# Patient Record
Sex: MALE | Race: WHITE | Employment: OTHER | URBAN - METROPOLITAN AREA
[De-identification: names, ages, dates, MRNs, and addresses within clinical notes are randomized per-mention and may not be internally consistent; named-entity substitution may affect disease eponyms.]

---

## 2017-01-01 ENCOUNTER — APPOINTMENT (OUTPATIENT)
Dept: GENERAL RADIOLOGY | Facility: HOSPITAL | Age: 82
End: 2017-01-01
Attending: EMERGENCY MEDICINE
Payer: MEDICARE

## 2017-01-01 ENCOUNTER — HOSPITAL ENCOUNTER (INPATIENT)
Facility: HOSPITAL | Age: 82
LOS: 2 days | Discharge: NURSING, REHAB OR LONG-TERM CARE | DRG: 300 | End: 2017-01-01
Attending: EMERGENCY MEDICINE | Admitting: HOSPITALIST
Payer: MEDICARE

## 2017-01-01 ENCOUNTER — APPOINTMENT (OUTPATIENT)
Dept: ULTRASOUND IMAGING | Facility: HOSPITAL | Age: 82
DRG: 300 | End: 2017-01-01
Attending: EMERGENCY MEDICINE
Payer: MEDICARE

## 2017-01-01 ENCOUNTER — APPOINTMENT (OUTPATIENT)
Dept: GENERAL RADIOLOGY | Facility: HOSPITAL | Age: 82
End: 2017-01-01
Attending: Other
Payer: MEDICARE

## 2017-01-01 ENCOUNTER — APPOINTMENT (OUTPATIENT)
Dept: CT IMAGING | Facility: HOSPITAL | Age: 82
End: 2017-01-01
Attending: HOSPITALIST
Payer: MEDICARE

## 2017-01-01 ENCOUNTER — HOSPITAL ENCOUNTER (OUTPATIENT)
Facility: HOSPITAL | Age: 82
Setting detail: OBSERVATION
LOS: 1 days | Discharge: NURSING, REHAB OR LONG-TERM CARE | End: 2017-01-01
Attending: EMERGENCY MEDICINE | Admitting: INTERNAL MEDICINE
Payer: MEDICARE

## 2017-01-01 ENCOUNTER — APPOINTMENT (OUTPATIENT)
Dept: GENERAL RADIOLOGY | Facility: HOSPITAL | Age: 82
End: 2017-01-01
Attending: INTERNAL MEDICINE
Payer: MEDICARE

## 2017-01-01 ENCOUNTER — APPOINTMENT (OUTPATIENT)
Dept: CT IMAGING | Facility: HOSPITAL | Age: 82
End: 2017-01-01
Attending: EMERGENCY MEDICINE
Payer: MEDICARE

## 2017-01-01 ENCOUNTER — HOSPITAL ENCOUNTER (EMERGENCY)
Facility: HOSPITAL | Age: 82
Discharge: HOME | End: 2017-01-01
Attending: EMERGENCY MEDICINE
Payer: MEDICARE

## 2017-01-01 ENCOUNTER — APPOINTMENT (OUTPATIENT)
Dept: ULTRASOUND IMAGING | Facility: HOSPITAL | Age: 82
End: 2017-01-01
Attending: EMERGENCY MEDICINE
Payer: MEDICARE

## 2017-01-01 ENCOUNTER — HOSPITAL ENCOUNTER (EMERGENCY)
Facility: HOSPITAL | Age: 82
End: 2017-01-01
Attending: EMERGENCY MEDICINE
Payer: MEDICARE

## 2017-01-01 VITALS
WEIGHT: 178 LBS | OXYGEN SATURATION: 96 % | RESPIRATION RATE: 20 BRPM | SYSTOLIC BLOOD PRESSURE: 91 MMHG | HEART RATE: 60 BPM | DIASTOLIC BLOOD PRESSURE: 47 MMHG | BODY MASS INDEX: 26 KG/M2 | TEMPERATURE: 98 F

## 2017-01-01 VITALS
WEIGHT: 177.94 LBS | DIASTOLIC BLOOD PRESSURE: 61 MMHG | BODY MASS INDEX: 26 KG/M2 | SYSTOLIC BLOOD PRESSURE: 99 MMHG | TEMPERATURE: 99 F | RESPIRATION RATE: 15 BRPM | HEART RATE: 59 BPM | OXYGEN SATURATION: 95 %

## 2017-01-01 VITALS
OXYGEN SATURATION: 99 % | RESPIRATION RATE: 18 BRPM | DIASTOLIC BLOOD PRESSURE: 78 MMHG | TEMPERATURE: 99 F | HEART RATE: 75 BPM | SYSTOLIC BLOOD PRESSURE: 154 MMHG

## 2017-01-01 DIAGNOSIS — S50.00XA CONTUSION OF ELBOW, UNSPECIFIED LATERALITY, INITIAL ENCOUNTER: ICD-10-CM

## 2017-01-01 DIAGNOSIS — S00.83XA CONTUSION OF FACE, INITIAL ENCOUNTER: Primary | ICD-10-CM

## 2017-01-01 DIAGNOSIS — D72.829 LEUKOCYTOSIS, UNSPECIFIED TYPE: ICD-10-CM

## 2017-01-01 DIAGNOSIS — S40.012A TRAUMATIC HEMATOMA OF LEFT SHOULDER, INITIAL ENCOUNTER: ICD-10-CM

## 2017-01-01 DIAGNOSIS — I82.621 ACUTE DEEP VEIN THROMBOSIS (DVT) OF BRACHIAL VEIN OF RIGHT UPPER EXTREMITY (HCC): Primary | ICD-10-CM

## 2017-01-01 DIAGNOSIS — S51.812A SKIN TEAR OF FOREARM WITHOUT COMPLICATION, LEFT, INITIAL ENCOUNTER: ICD-10-CM

## 2017-01-01 DIAGNOSIS — I46.9 CARDIOPULMONARY ARREST (HCC): Primary | ICD-10-CM

## 2017-01-01 DIAGNOSIS — D64.9 ANEMIA, UNSPECIFIED TYPE: ICD-10-CM

## 2017-01-01 DIAGNOSIS — R29.6 FREQUENT FALLS: Primary | ICD-10-CM

## 2017-01-01 DIAGNOSIS — S01.81XA LACERATION OF FACE, INITIAL ENCOUNTER: ICD-10-CM

## 2017-01-01 DIAGNOSIS — Z95.0 PACEMAKER: ICD-10-CM

## 2017-01-01 DIAGNOSIS — S51.019A SKIN TEAR OF ELBOW WITHOUT COMPLICATION, UNSPECIFIED LATERALITY, INITIAL ENCOUNTER: ICD-10-CM

## 2017-01-01 DIAGNOSIS — N28.9 RENAL INSUFFICIENCY: ICD-10-CM

## 2017-01-01 DIAGNOSIS — I42.9 CARDIOMYOPATHY, UNSPECIFIED TYPE (HCC): ICD-10-CM

## 2017-01-01 LAB
APTT PPP: 101.5 SECONDS (ref 25–34)
APTT PPP: 29.7 SECONDS (ref 25–34)
APTT PPP: 90.6 SECONDS (ref 25–34)
APTT PPP: 96.7 SECONDS (ref 25–34)
BASOPHILS # BLD AUTO: 0.04 X10(3) UL (ref 0–0.1)
BASOPHILS NFR BLD AUTO: 0.3 %
BUN BLD-MCNC: 24 MG/DL (ref 8–20)
BUN BLD-MCNC: 27 MG/DL (ref 8–20)
CALCIUM BLD-MCNC: 8.6 MG/DL (ref 8.3–10.3)
CALCIUM BLD-MCNC: 8.7 MG/DL (ref 8.3–10.3)
CHLORIDE: 111 MMOL/L (ref 101–111)
CHLORIDE: 111 MMOL/L (ref 101–111)
CO2: 25 MMOL/L (ref 22–32)
CO2: 27 MMOL/L (ref 22–32)
CREAT BLD-MCNC: 1.42 MG/DL (ref 0.7–1.3)
CREAT BLD-MCNC: 1.58 MG/DL (ref 0.7–1.3)
EOSINOPHIL # BLD AUTO: 0.13 X10(3) UL (ref 0–0.3)
EOSINOPHIL NFR BLD AUTO: 1 %
ERYTHROCYTE [DISTWIDTH] IN BLOOD BY AUTOMATED COUNT: 13.7 % (ref 11.5–16)
GLUCOSE BLD-MCNC: 105 MG/DL (ref 70–99)
GLUCOSE BLD-MCNC: 139 MG/DL (ref 70–99)
GLUCOSE BLD-MCNC: 203 MG/DL (ref 65–99)
HCT VFR BLD AUTO: 33.8 % (ref 37–53)
HGB BLD-MCNC: 11.1 G/DL (ref 13–17)
IMMATURE GRANULOCYTE COUNT: 0.33 X10(3) UL (ref 0–1)
IMMATURE GRANULOCYTE RATIO %: 2.6 %
INR BLD: 1.19 (ref 0.89–1.11)
LYMPHOCYTES # BLD AUTO: 0.87 X10(3) UL (ref 0.9–4)
LYMPHOCYTES NFR BLD AUTO: 6.9 %
MCH RBC QN AUTO: 33.4 PG (ref 27–33.2)
MCHC RBC AUTO-ENTMCNC: 32.8 G/DL (ref 31–37)
MCV RBC AUTO: 101.8 FL (ref 80–99)
MONOCYTES # BLD AUTO: 1.07 X10(3) UL (ref 0.1–0.6)
MONOCYTES NFR BLD AUTO: 8.5 %
NEUTROPHIL ABS PRELIM: 10.14 X10 (3) UL (ref 1.3–6.7)
NEUTROPHILS # BLD AUTO: 10.14 X10(3) UL (ref 1.3–6.7)
NEUTROPHILS NFR BLD AUTO: 80.7 %
PLATELET # BLD AUTO: 319 10(3)UL (ref 150–450)
PLATELET # BLD AUTO: 348 10(3)UL (ref 150–450)
POTASSIUM SERPL-SCNC: 4.2 MMOL/L (ref 3.6–5.1)
POTASSIUM SERPL-SCNC: 4.6 MMOL/L (ref 3.6–5.1)
PSA SERPL DL<=0.01 NG/ML-MCNC: 15.2 SECONDS (ref 12–14.3)
RBC # BLD AUTO: 3.32 X10(6)UL (ref 3.8–5.8)
RED CELL DISTRIBUTION WIDTH-SD: 50.9 FL (ref 35.1–46.3)
SODIUM SERPL-SCNC: 142 MMOL/L (ref 136–144)
SODIUM SERPL-SCNC: 144 MMOL/L (ref 136–144)
WBC # BLD AUTO: 12.6 X10(3) UL (ref 4–13)

## 2017-01-01 PROCEDURE — 12011 RPR F/E/E/N/L/M 2.5 CM/<: CPT

## 2017-01-01 PROCEDURE — 99285 EMERGENCY DEPT VISIT HI MDM: CPT

## 2017-01-01 PROCEDURE — 70450 CT HEAD/BRAIN W/O DYE: CPT | Performed by: EMERGENCY MEDICINE

## 2017-01-01 PROCEDURE — 97530 THERAPEUTIC ACTIVITIES: CPT

## 2017-01-01 PROCEDURE — 97110 THERAPEUTIC EXERCISES: CPT

## 2017-01-01 PROCEDURE — 84484 ASSAY OF TROPONIN QUANT: CPT | Performed by: EMERGENCY MEDICINE

## 2017-01-01 PROCEDURE — 81003 URINALYSIS AUTO W/O SCOPE: CPT | Performed by: FAMILY MEDICINE

## 2017-01-01 PROCEDURE — 85730 THROMBOPLASTIN TIME PARTIAL: CPT | Performed by: HOSPITALIST

## 2017-01-01 PROCEDURE — 76377 3D RENDER W/INTRP POSTPROCES: CPT | Performed by: HOSPITALIST

## 2017-01-01 PROCEDURE — 73560 X-RAY EXAM OF KNEE 1 OR 2: CPT | Performed by: INTERNAL MEDICINE

## 2017-01-01 PROCEDURE — 83090 ASSAY OF HOMOCYSTEINE: CPT | Performed by: OTHER

## 2017-01-01 PROCEDURE — 97535 SELF CARE MNGMENT TRAINING: CPT

## 2017-01-01 PROCEDURE — 84484 ASSAY OF TROPONIN QUANT: CPT | Performed by: INTERNAL MEDICINE

## 2017-01-01 PROCEDURE — 92950 HEART/LUNG RESUSCITATION CPR: CPT

## 2017-01-01 PROCEDURE — 83921 ORGANIC ACID SINGLE QUANT: CPT | Performed by: OTHER

## 2017-01-01 PROCEDURE — 80048 BASIC METABOLIC PNL TOTAL CA: CPT | Performed by: EMERGENCY MEDICINE

## 2017-01-01 PROCEDURE — 96361 HYDRATE IV INFUSION ADD-ON: CPT

## 2017-01-01 PROCEDURE — 85027 COMPLETE CBC AUTOMATED: CPT | Performed by: INTERNAL MEDICINE

## 2017-01-01 PROCEDURE — 80053 COMPREHEN METABOLIC PANEL: CPT | Performed by: EMERGENCY MEDICINE

## 2017-01-01 PROCEDURE — 85049 AUTOMATED PLATELET COUNT: CPT | Performed by: HOSPITALIST

## 2017-01-01 PROCEDURE — 85025 COMPLETE CBC W/AUTO DIFF WBC: CPT | Performed by: EMERGENCY MEDICINE

## 2017-01-01 PROCEDURE — 97116 GAIT TRAINING THERAPY: CPT

## 2017-01-01 PROCEDURE — 93010 ELECTROCARDIOGRAM REPORT: CPT

## 2017-01-01 PROCEDURE — 72125 CT NECK SPINE W/O DYE: CPT | Performed by: EMERGENCY MEDICINE

## 2017-01-01 PROCEDURE — 73080 X-RAY EXAM OF ELBOW: CPT | Performed by: EMERGENCY MEDICINE

## 2017-01-01 PROCEDURE — 80048 BASIC METABOLIC PNL TOTAL CA: CPT | Performed by: INTERNAL MEDICINE

## 2017-01-01 PROCEDURE — 73030 X-RAY EXAM OF SHOULDER: CPT | Performed by: EMERGENCY MEDICINE

## 2017-01-01 PROCEDURE — 97162 PT EVAL MOD COMPLEX 30 MIN: CPT

## 2017-01-01 PROCEDURE — 97166 OT EVAL MOD COMPLEX 45 MIN: CPT

## 2017-01-01 PROCEDURE — 82746 ASSAY OF FOLIC ACID SERUM: CPT | Performed by: OTHER

## 2017-01-01 PROCEDURE — 99284 EMERGENCY DEPT VISIT MOD MDM: CPT

## 2017-01-01 PROCEDURE — 85730 THROMBOPLASTIN TIME PARTIAL: CPT | Performed by: EMERGENCY MEDICINE

## 2017-01-01 PROCEDURE — 71010 XR CHEST AP PORTABLE  (CPT=71010): CPT | Performed by: EMERGENCY MEDICINE

## 2017-01-01 PROCEDURE — 81001 URINALYSIS AUTO W/SCOPE: CPT | Performed by: EMERGENCY MEDICINE

## 2017-01-01 PROCEDURE — 93971 EXTREMITY STUDY: CPT | Performed by: EMERGENCY MEDICINE

## 2017-01-01 PROCEDURE — 84132 ASSAY OF SERUM POTASSIUM: CPT | Performed by: INTERNAL MEDICINE

## 2017-01-01 PROCEDURE — 85025 COMPLETE CBC W/AUTO DIFF WBC: CPT | Performed by: INTERNAL MEDICINE

## 2017-01-01 PROCEDURE — 73200 CT UPPER EXTREMITY W/O DYE: CPT | Performed by: HOSPITALIST

## 2017-01-01 PROCEDURE — 96365 THER/PROPH/DIAG IV INF INIT: CPT

## 2017-01-01 PROCEDURE — 85610 PROTHROMBIN TIME: CPT | Performed by: EMERGENCY MEDICINE

## 2017-01-01 PROCEDURE — 97167 OT EVAL HIGH COMPLEX 60 MIN: CPT

## 2017-01-01 PROCEDURE — 93005 ELECTROCARDIOGRAM TRACING: CPT

## 2017-01-01 PROCEDURE — 82607 VITAMIN B-12: CPT | Performed by: OTHER

## 2017-01-01 PROCEDURE — 96360 HYDRATION IV INFUSION INIT: CPT

## 2017-01-01 PROCEDURE — 83735 ASSAY OF MAGNESIUM: CPT | Performed by: INTERNAL MEDICINE

## 2017-01-01 PROCEDURE — 82962 GLUCOSE BLOOD TEST: CPT

## 2017-01-01 PROCEDURE — 83036 HEMOGLOBIN GLYCOSYLATED A1C: CPT | Performed by: OTHER

## 2017-01-01 PROCEDURE — 72110 X-RAY EXAM L-2 SPINE 4/>VWS: CPT | Performed by: OTHER

## 2017-01-01 RX ORDER — LISINOPRIL 5 MG/1
5 TABLET ORAL DAILY
Status: DISCONTINUED | OUTPATIENT
Start: 2017-01-01 | End: 2017-01-01

## 2017-01-01 RX ORDER — ASPIRIN 81 MG/1
81 TABLET ORAL DAILY
Status: DISCONTINUED | OUTPATIENT
Start: 2017-01-01 | End: 2017-01-01

## 2017-01-01 RX ORDER — CAPTOPRIL 25 MG/1
25 TABLET ORAL 2 TIMES DAILY
Status: DISCONTINUED | OUTPATIENT
Start: 2017-01-01 | End: 2017-01-01

## 2017-01-01 RX ORDER — FINASTERIDE 5 MG/1
5 TABLET, FILM COATED ORAL NIGHTLY
Status: DISCONTINUED | OUTPATIENT
Start: 2017-01-01 | End: 2017-01-01

## 2017-01-01 RX ORDER — HEPARIN SODIUM AND DEXTROSE 10000; 5 [USP'U]/100ML; G/100ML
18 INJECTION INTRAVENOUS CONTINUOUS
Status: DISCONTINUED | OUTPATIENT
Start: 2017-01-01 | End: 2017-01-01

## 2017-01-01 RX ORDER — ACETAMINOPHEN 325 MG/1
650 TABLET ORAL EVERY 6 HOURS PRN
Status: DISCONTINUED | OUTPATIENT
Start: 2017-01-01 | End: 2017-01-01

## 2017-01-01 RX ORDER — ALLOPURINOL 100 MG/1
250 TABLET ORAL DAILY
COMMUNITY

## 2017-01-01 RX ORDER — HEPARIN SODIUM AND DEXTROSE 10000; 5 [USP'U]/100ML; G/100ML
INJECTION INTRAVENOUS CONTINUOUS
Status: DISCONTINUED | OUTPATIENT
Start: 2017-01-01 | End: 2017-01-01

## 2017-01-01 RX ORDER — FINASTERIDE 5 MG/1
5 TABLET, FILM COATED ORAL DAILY
COMMUNITY

## 2017-01-01 RX ORDER — AMIODARONE HYDROCHLORIDE 200 MG/1
200 TABLET ORAL DAILY
Status: DISCONTINUED | OUTPATIENT
Start: 2017-01-01 | End: 2017-01-01

## 2017-01-01 RX ORDER — LISINOPRIL 5 MG/1
5 TABLET ORAL DAILY
Qty: 30 TABLET | Refills: 3 | Status: SHIPPED | OUTPATIENT
Start: 2017-01-01

## 2017-01-01 RX ORDER — ALFUZOSIN HYDROCHLORIDE 10 MG/1
10 TABLET, EXTENDED RELEASE ORAL
Status: DISCONTINUED | OUTPATIENT
Start: 2017-01-01 | End: 2017-01-01

## 2017-01-01 RX ORDER — CHOLECALCIFEROL (VITAMIN D3) 125 MCG
500 CAPSULE ORAL DAILY
Status: DISCONTINUED | OUTPATIENT
Start: 2017-01-01 | End: 2017-01-01

## 2017-01-01 RX ORDER — ONDANSETRON 2 MG/ML
4 INJECTION INTRAMUSCULAR; INTRAVENOUS EVERY 4 HOURS PRN
Status: CANCELLED | OUTPATIENT
Start: 2017-01-01

## 2017-01-01 RX ORDER — CETIRIZINE HYDROCHLORIDE 10 MG/1
10 TABLET ORAL DAILY
Status: DISCONTINUED | OUTPATIENT
Start: 2017-01-01 | End: 2017-01-01

## 2017-01-01 RX ORDER — ASPIRIN 81 MG/1
81 TABLET ORAL DAILY
Qty: 30 TABLET | Refills: 3 | Status: SHIPPED | OUTPATIENT
Start: 2017-01-01 | End: 2017-01-01

## 2017-01-01 RX ORDER — METOPROLOL TARTRATE 50 MG/1
50 TABLET, FILM COATED ORAL
Status: DISCONTINUED | OUTPATIENT
Start: 2017-01-01 | End: 2017-01-01

## 2017-01-01 RX ORDER — FINASTERIDE 5 MG/1
5 TABLET, FILM COATED ORAL DAILY
Status: DISCONTINUED | OUTPATIENT
Start: 2017-01-01 | End: 2017-01-01

## 2017-01-01 RX ORDER — SODIUM CHLORIDE 9 MG/ML
INJECTION, SOLUTION INTRAVENOUS ONCE
Status: COMPLETED | OUTPATIENT
Start: 2017-01-01 | End: 2017-01-01

## 2017-01-01 RX ORDER — HEPARIN SODIUM AND DEXTROSE 10000; 5 [USP'U]/100ML; G/100ML
18 INJECTION INTRAVENOUS ONCE
Status: COMPLETED | OUTPATIENT
Start: 2017-01-01 | End: 2017-01-01

## 2017-01-01 RX ORDER — POTASSIUM CHLORIDE 20 MEQ/1
40 TABLET, EXTENDED RELEASE ORAL EVERY 4 HOURS
Status: COMPLETED | OUTPATIENT
Start: 2017-01-01 | End: 2017-01-01

## 2017-01-01 RX ORDER — HYDROCHLOROTHIAZIDE 25 MG/1
25 TABLET ORAL DAILY
Status: DISCONTINUED | OUTPATIENT
Start: 2017-01-01 | End: 2017-01-01

## 2017-01-01 RX ORDER — DOCUSATE SODIUM 100 MG/1
100 CAPSULE, LIQUID FILLED ORAL 2 TIMES DAILY
COMMUNITY

## 2017-01-01 RX ORDER — ACETAMINOPHEN 500 MG
1000 TABLET ORAL ONCE
Status: COMPLETED | OUTPATIENT
Start: 2017-01-01 | End: 2017-01-01

## 2017-01-01 RX ORDER — ONDANSETRON 4 MG/1
4 TABLET, ORALLY DISINTEGRATING ORAL ONCE
Status: DISCONTINUED | OUTPATIENT
Start: 2017-01-01 | End: 2017-01-01

## 2017-01-01 RX ORDER — HEPARIN SODIUM 5000 [USP'U]/ML
80 INJECTION INTRAVENOUS; SUBCUTANEOUS ONCE
Status: COMPLETED | OUTPATIENT
Start: 2017-01-01 | End: 2017-01-01

## 2017-01-01 RX ORDER — ACETAMINOPHEN 325 MG/1
650 TABLET ORAL EVERY 6 HOURS PRN
COMMUNITY

## 2017-06-03 NOTE — ED PROVIDER NOTES
Patient Seen in: [facility] Emergency Department    History   Patient presents with:  Head Neck Injury (neurologic, musculoskeletal)    Stated Complaint: tripped with wlaker and hit head     HPI    Patient was opening his door.   As the door open, hi INSERTION      SKIN SURGERY  07/12/2011    Comment BCC nodular / L cheek / bx by Dr. Sarah Pham / Mohs surgery by Dr. 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 Chronic lacunar infarcts in the basal ganglia are noted. Ventricles and sulci are appropriate for the patient's age. No mass effect. Visualized portions of paranasal sinuses are unremarkable.    Visualized portions of the mastoid air cells are

## 2017-10-20 PROBLEM — R29.6 FREQUENT FALLS: Status: ACTIVE | Noted: 2017-01-01

## 2017-10-21 PROBLEM — S40.012A: Status: ACTIVE | Noted: 2017-01-01

## 2017-10-21 PROBLEM — I42.9 CARDIOMYOPATHY, UNSPECIFIED TYPE (HCC): Status: ACTIVE | Noted: 2017-01-01

## 2017-10-21 PROBLEM — D64.9 ANEMIA, UNSPECIFIED TYPE: Status: ACTIVE | Noted: 2017-01-01

## 2017-10-21 PROBLEM — S50.00XA: Status: ACTIVE | Noted: 2017-01-01

## 2017-10-21 PROBLEM — N28.9 RENAL INSUFFICIENCY: Status: ACTIVE | Noted: 2017-01-01

## 2017-10-21 PROBLEM — S51.019A: Status: ACTIVE | Noted: 2017-01-01

## 2017-10-21 PROBLEM — D72.829 LEUKOCYTOSIS, UNSPECIFIED TYPE: Status: ACTIVE | Noted: 2017-01-01

## 2017-10-21 NOTE — ED PROVIDER NOTES
Xr Chest Ap Portable  (cpt=71010)    Result Date: 10/20/2017  PROCEDURE:  XR CHEST AP PORTABLE (CPT=71010)  TECHNIQUE:  AP chest radiograph was obtained. COMPARISON:  None.   INDICATIONS:  Multiple falls  PATIENT STATED HISTORY: (As transcribed by Roscoe Schwartz

## 2017-10-21 NOTE — PLAN OF CARE
Patient/Family Goals    • Patient/Family Short Term Goal Progressing          Multidisciplinary Discharge Rounds held 10/21/2017.     Treatment team members present today include , , Charge Nurse,  Nurse, RT, PT and Pharmacy caring discharge date:TBD    Current discharge plan: Back to Independent Living    Back up discharge plan:     Patient a/o X4 and resting comfortably in bed.  Patient tends to have a baseline of confusion but after being re-oriented to the situation and education,

## 2017-10-21 NOTE — ED NOTES
Plan of care discussed with family. Patient denies c/o pain or discomfort.  Respirations normal skin p/w/d

## 2017-10-21 NOTE — ED PROVIDER NOTES
Patient Seen in: [facility] Emergency Department    History   Patient presents with:  Fatigue (constitutional, neurologic)    Stated Complaint: Multiple falls     HPI    Patient resident of nursing home.   He has a history of atrial fibrillation and Surgical History:  2005: COLONOSCOPY      Comment: NL  9/2005: COLONOSCOPY,DIAGNOSTIC      Comment: nl colonoscopy, repeat in 10 years  1/8/2015: OTHER SURGICAL HISTORY      Comment: Dara Snyder Dr. Governor [place]  09/03/15: OTHER SURGICAL HISTORY      Comment: [place] Temp 98.5 °F (36.9 °C) (Temporal)   Resp 17   Wt 85.3 kg   SpO2 97%   BMI 27.77 kg/m²         Physical Exam  General: The patient is awake, alert, conversant. Patient answers questions quickly and appropriately for the most part. Scalp is atraumatic.   Severo Pastures PTT 39.7 (*)     All other components within normal limits    Narrative: The aPTT Heparin Therapeutic Range is approximately 65- 104 seconds. The therapeutic range has been validated against 0.3-0.7 heparin anti-Xa units/mL.      CBC W/ DIFFERENTIAL - A antibiotic ointment and clean dressings. Often elderly persons with falls have some underlying metabolic or infectious process.   Of course cardiac syncope or even TIA or seizure are included in the differential.    CBC shows elevated white count with mild etiology for the leukocytosis. The right shoulder joint is not hot or cellulitic appearing.   It appears ecchymotic and there is soft tissue swelling anteriorly consistent with a hematoma    Daughter has signed paperwork indicating patient does not want ex

## 2017-10-21 NOTE — H&P
[facility] Hospitalist H&P       CC: Patient presents with:  Fatigue (constitutional, neurologic)       PCP: Suki Watts MD    History of Present Illness:  Patient is a 80year old male with PMH sig for afib on xarelto, GERD, HTN presents with recurre Comment: BCC nodular / L cheek / bx by Dr. Leticia Dang                / Mohs surgery by Dr. 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 COMPARISON:  None. INDICATIONS:  Multiple falls  PATIENT STATED HISTORY: (As transcribed by Technologist)  Multiple falls today.  Ana María Lubin (CPT=73030)     TECHNIQUE:  Multiple views were obtained. COMPARISON:  None. INDICATIONS:  Multiple falls  PATIENT STATED HISTORY: (As transcribed by Technologist)  Multiple falls today. Patient fell and injured left shoulder. Difficulty moving arm.     Chele Arevalo dislocation of the right shoulder. Ct Brain Or Head (38112)    Result Date: 10/20/2017  PROCEDURE:  CT BRAIN OR HEAD (56198)  COMPARISON:  [facility] , CT BRAIN OR HEAD (64116), 8/08/2016, 16:58. [facility] , CT BRAIN OR HEAD (03398), 6/03/2017, 17:02.   Chelita Ricci CERVICAL (CPT=72125)  COMPARISON:  [facility] , CT SPINE CERVICAL (CPT=72125), 8/08/2016, 17:01. INDICATIONS:  Multiple falls  TECHNIQUE:  Noncontrast CT scanning of the cervical spine is performed from the skull base through C7.   Multiplanar reconstructions lung apices demonstrate scarring/atelectasis. CONCLUSION:  No acute fracture or subluxation in the cervical spine. Stable degenerative changes as above.     Dictated by: Evin Aragon MD on 10/20/2017 at 20:53     Approved by: Evin Aragon MD Edema, unspecified type [R60.9 (ICD-10-CM)] ---------------------------------------------------------------------------- History:   Edema of both lower extremities.  ---------------------------------------------------------------------------- Impressions - !       !          !augmentation; compressible   ! +-------------------------+-------+----------+-----------------------------+ ! Right superficial femoral!Patent ! None      ! Compressible                 ! !distal                   ! !          ! augmentation; compressible   ! +-------------------------+-------+----------+-----------------------------+ ! Left saphenofemoral      !Patent ! None      ! Compressible                 ! !junction                 !       !          ! augmentation;         ! !                         !       !          !compressible                 ! +-------------------------+-------+----------+-----------------------------+ ! Left peroneal            !Patent ! None      ! Normal augmentation;         ! ! radiograph was obtained. COMPARISON:  None. INDICATIONS:  Multiple falls  PATIENT STATED HISTORY: (As transcribed by Technologist)  Patient offered no additional history at this time. FINDINGS:  Left chest pacemaker. Tortuous thoracic aorta.  Cardiomeg Based on patients current state of illness, I anticipate that, after discharge, patient will require TBD.

## 2017-10-21 NOTE — CONSULTS
[facility] Cardiology Consultation    [address] Patient Status:  Inpatient    1931 MRN [id number]   [facility] 5NW-A Attending Gianfranco Vaca MD   Hosp Day # 0 PCP Mery Blakely MD     Reason for Consultation:  Vidal Nam. Comment: BCC-superficial to right malar cheek/ MOHS by                AB  No date: SPECIAL SERVICE OR REPORT      Comment: TURP with Lisek.   1970: SPECIAL SERVICE OR REPORT      Comment: Ear Surgery - stapendectomy bilaterally  No date: SPECIAL SERVICE OR normal,  rub or gallop. AoEM  Lungs: CTA  Abdomen: Soft, non-tender. Extremities: No edema. Right elbow bandaged  Neurologic: no focal deficits  Skin: Warm and dry.           Telemetry: paced    Laboratories and Data:  Diagnostics:    EKG, 10/21/2017:

## 2017-10-21 NOTE — CONSULTS
Came to see pt, off floor for ct. Will see in am.  Testing ordered for L/S oa, neuropathy labs.  Pt has h/o imbalance, saw neurology for this issue 2015 for cerebellar meningioma

## 2017-10-22 NOTE — CM/SW NOTE
Code 40: Notification of Observation Status Change delivered to patient and daughter. Patient signed. Original given to patient and other copy filed.

## 2017-10-22 NOTE — PROGRESS NOTES
[facility] [address] [place] [address] Cardiology Progress Note        Qian Herndon [id number] Patient Status:  Inpatient    1931 MRN [id number]   [facility] 5NW-A Attending Rell Raymundo MD   Hosp Day # 1 PCP MD Syd Alvarez CL  103  105   --    CO2  28.0  29.0   --    BUN  27*  27*   --    CREATSERUM  1.58*  1.52*   --    CA  9.2  8.9   --    GLU  114*  86   --        Recent Labs   Lab  10/20/17   1939   ALT  21   AST  20   ALB  3.1*       Recent Labs   Lab  10/20/17   1939

## 2017-10-22 NOTE — PLAN OF CARE
GENITOURINARY - ADULT    • Absence of urinary retention Progressing                PATIENT A&OX4. HARD OF HEARING. DENIES SOB/CHEST PAIN. SATURATING ABOVE 92% ON ROOM AIR. A PACED ON TELEMETRY. NO COUGH, NO FEVER. DENIES NAUSEA, NO VOMITING, NO STOOL.

## 2017-10-22 NOTE — PHYSICAL THERAPY NOTE
PHYSICAL THERAPY EVALUATION - INPATIENT     Room Number: 195/098-J  Evaluation Date: 10/22/2017  Type of Evaluation: Initial  Physician Order: PT Eval and Treat    Presenting Problem: Frequent falls  Reason for Therapy: Mobility Dysfunction and Dischar Comment: Cystoscopy - Dr. Claros Dense   No date: OTHER SURGICAL HISTORY      Comment: stapes bone replacements  07/12/2011: SKIN SURGERY      Comment: BCC nodular / L cheek / bx by Dr. Darvin Ferrer                / Mohs surgery by Dr. Maricarmen Menard  2/21/17: SKIN Mobility Scale: 8/20                           NEUROLOGICAL FINDINGS                      ACTIVITY TOLERANCE  Room air  No shortness of breath    AM-PAC '6-Clicks' INPATIENT SHORT FORM - BASIC MOBILITY  How much difficulty does the patient currently have. Milton Sandoval concerns addressed; Family present    ASSESSMENT   Patient is a 80year old male admitted on 10/20/2017 for frequent falls.   Pertinent comorbidities and personal factors impacting therapy include long history of falls in the past.  In this PT evaluation, th Goal #6    Goal Comments: Goals established on 10/22/2017

## 2017-10-22 NOTE — PLAN OF CARE
GENITOURINARY - ADULT    • Absence of urinary retention Progressing        Patient/Family Goals    • Patient/Family Short Term Goal Progressing          Patient is AO x 3-4, forgetful but easily reoriented. Patient denies pain at this time.  Right shoulder

## 2017-10-22 NOTE — OCCUPATIONAL THERAPY NOTE
OCCUPATIONAL THERAPY EVALUATION - INPATIENT     Room Number: 463/141-C  Evaluation Date: 10/22/2017  Type of Evaluation: Initial  Presenting Problem: frequent falls and generalized weakness    Physician Order: IP Consult to Occupational Therapy  Reason for medial epicondyle along. Osteoarthritic changes throughout the right elbow\"                 XR R Shoulder 10/20/17:  \"CONCLUSION:  No acute fracture of the right shoulder.  There is chronic subluxation of the humeral head superiorly in relation to the gle \"Impressions - Right: Negative for deep and superficial vein thrombosis. - Left: Negative for deep and superficial vein thrombosis. \"     Xr Chest 10/20/17: \"CONCLUSION:  Mild scarring/atelectasis in the lower lungs with possible small bilateral pleural by Dr. Mahendra Allen                / Mohs surgery by Dr. 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 Daughter present throughout session. Education on role of OT and plan of care.   Education and assessment of supine to sit transfer (max assist for trunk Subacute rehab is recommended for 14-16 days. After this period of rehabilitation patient should achieve mod ind level in basic ADLs.       The patient is functioning below his previous functional level and would benefit from skilled inpatient OT to ad

## 2017-10-22 NOTE — CONSULTS
[facility]    PATIENT'S NAME: LESLIE BECKER   ATTENDING PHYSICIAN: Westley Ross MD   CONSULTING PHYSICIAN: Yue Ku M.D.    PATIENT ACCOUNT#:   [de-identified]    LOCATION:  [address] [place] [address]  MEDICAL RECORD #:   [id number]       DATE OF BIRTH multivitamin. ALLERGIES:  Allergic or intolerant to sulfa. SOCIAL HISTORY:  Nonsmoker. No alcohol. FAMILY HISTORY:  Cancer, hypertension, uremic poisoning, kidney stone, coronary artery disease.     REVIEW OF SYSTEMS:  The rest of his review of sy X-ray of the lumbar spine shows that patient has moderate facet arthropathy throughout the lumbar spine, most pronounced at L4-5 and L5-S1. No fractures, just degenerate changes.     ASSESSMENT AND PLAN:  Patient has a multifactorial gait disorder wit

## 2017-10-23 NOTE — PROGRESS NOTES
Asia Corona is a 80year old male.    Patient presents with:  Fatigue (constitutional, neurologic)    HPI:    Neurology fu note    Pt stable today, slept well  familfy at bedside  Denies any new sx  Plan for THAI for balance training    Component      L No tremors, bradykinesia, or rigidity. Finger-to-nose is intact. Rapid alternating movements are slowed, but symmetric in the upper extremities. Heel-knee-shin not tested due to back pain.     ASSESSMENT/ PLAN:     Patient has a multifactorial gait disor

## 2017-10-23 NOTE — PLAN OF CARE
GENITOURINARY - ADULT    • Absence of urinary retention Progressing        Impaired Activities of Daily Living    • Achieve highest/safest level of independence in self care Progressing        Impaired Functional Mobility    • Achieve highest/safest level

## 2017-10-23 NOTE — PHYSICAL THERAPY NOTE
PHYSICAL THERAPY TREATMENT NOTE - INPATIENT    Room Number: 642/735-R     Session: 1   Number of Visits to Meet Established Goals: 5    Presenting Problem: Frequent falls     History related to current admission: pt admitted from [place] [address] for ha replacements  07/12/2011: SKIN SURGERY      Comment: BCC nodular / L cheek / bx by Dr. Jeniffer Mane                / Mohs surgery by Dr. 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

## 2017-10-23 NOTE — OCCUPATIONAL THERAPY NOTE
OCCUPATIONAL THERAPY TREATMENT NOTE - INPATIENT     Room Number: 972/671-X  Session: 1   Number of Visits to Meet Established Goals: 5    Presenting Problem: frequent falls and generalized weakness    History related to current admission: Pt is 80year old 10/20/17:  \"CONCLUSION:  No acute fracture of the right shoulder. There is chronic subluxation of the humeral head superiorly in relation to the glenoid suggesting a chronic rotator cuff tear.  There is extensive soft tissue swelling overlying the right sh superficial vein thrombosis. \"     Xr Chest 10/20/17: \"CONCLUSION:  Mild scarring/atelectasis in the lower lungs with possible small bilateral pleural effusions. No lobar pneumonia or overt congestive failure. 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   1970: SPECIAL SERVICE OR REPORT      Comment: E during transfer. Engaged in seated UE AROM HEP. Educated on benefits of UE AROM in pain free ranges. Patient End of Session: Up in chair;Needs met;Call light within reach;RN aware of session/findings; All patient questions and concerns addressed; Family pr

## 2017-10-23 NOTE — PROGRESS NOTES
Pt is a 79 y/o male admitted with mechanical falls,with right shoulder hematoma. moderate pain at the shoulder and right knee. pt denies SOB,fever,N/V.pts daughter at bedside. no s/s of  aspiration noted. aspiration and fall precaution. Ortho was paged.   Eric Brooks

## 2017-10-23 NOTE — PLAN OF CARE
Problem: Patient/Family Goals  Goal: Patient/Family Short Term Goal  Patient's Short Term Goal:   10/21 Am: To rest  10/21/2017 - \"i just want to sleep\"  10/22/2017 - \"SLEEP\"  10/23-resolve shoulder pain  Interventions:   10/23-pain meds. fall precautio

## 2017-10-23 NOTE — CM/SW NOTE
10/23/17 1600   CM/SW Referral Data   Referral Source Social Work (self-referral)   Reason for Referral Discharge planning   Informant Patient; Children   Pertinent Medical Hx   Primary Care Physician Name Suman Ware   Patient Info   Patient's Mental

## 2017-10-23 NOTE — PROGRESS NOTES
[facility] Hospitalist Progress Note     PCP: Emilie Veronica MD    Chief Complaint: follow-up    Overnight/Interim Events:  Temp 100.7    SUBJECTIVE:  Pt laying in bed.  Feels ok at rest. Son at bedside, d/w him about plan/hospital course, concerned as fa • Vitamin B-12  500 mcg Oral Daily   • Metoprolol Tartrate  25 mg Oral 2x Daily(Beta Blocker)   • allopurinol  250 mg Oral Daily   • amiodarone HCl  200 mg Oral Daily   • cetirizine  10 mg Oral Daily   • finasteride  5 mg Oral Nightly   • Alfuzosin HCl Hospitalist  [phone]  10/23/2017  5:17 PM

## 2017-10-23 NOTE — PROGRESS NOTES
[facility] Hospitalist Progress Note     PCP: Andreia Merrill MD    Chief Complaint: follow-up    Overnight/Interim Events:      SUBJECTIVE:  Pt sitting in chair. Some pain but better, able to move shoulder a little.      OBJECTIVE:  Temp:  [97.5 °F (36.4 • Metoprolol Tartrate  50 mg Oral Daily Beta Blocker   • Alfuzosin HCl ER  10 mg Oral Daily with breakfast   • lisinopril  5 mg Oral Daily   • aspirin EC  81 mg Oral Daily               Assessment/Plan:      Recurrent falls  - ?mechanical falls, ?orthost

## 2017-10-23 NOTE — PROGRESS NOTES
[facility] [address] [facility] Cardiology Progress Note         [facility] Patient Status:  Inpatient    1931 MRN [id number]   [facility] 5NW-A Attending Neelima Kimball MD   Hosp Day # 1 PCP MD Kiesha Murry 10/20/17   1939  10/21/17   0555  10/21/17   1700   NA  139  141   --    K  3.6  3.4*  4.0   CL  103  105   --    CO2  28.0  29.0   --    BUN  27*  27*   --    CREATSERUM  1.58*  1.52*   --    CA  9.2  8.9   --    GLU  114*  86   --        Recent Labs   La

## 2017-10-24 NOTE — CM/SW NOTE
Patient failed inpatient criteria. Second level of review completed and supports observation. UR committee in agreement. Discussed with Dr. Chasity Macias  who approves observation status. FRITZ was given to the patient and order written on 10/23/17.

## 2017-10-24 NOTE — CM/SW NOTE
CHAPIN spoke with admissions at [facility] and pt is approved for transfer with bed available today. CHAPNI met bedside with pt and daughter, Kevin Aguillon, to inform of admission approval and finalize transfer plans for today. [facility] arranged for 2:45pm transport.  DUSTIN

## 2017-10-24 NOTE — PROGRESS NOTES
NURSING DISCHARGE NOTE    Discharged Rehab facility via Wheelchair. Accompanied by Support staff  Belongings Taken by patient/family. Discharge instructions and prescriptions given to patient and his daughter Keagan Gilliam. VSS. Iv discontinued.  Report call

## 2017-10-24 NOTE — DISCHARGE SUMMARY
[facility] Internal Medicine Discharge Summary   Patient ID:  Von Frias  [id number]  17 year old  1/22/1931    Admit date: 10/20/2017    Discharge date and time: 10/24/2017     Attending Physician: Betty Lal    Primary Care Physician: Jack Treviño repeatedly traumatized R shoulder during falls and R shoulder pain/swelling has been worsening. Pt has tried to massage the shoulder to alleviate the pain however this has made the swelling worse. Pt denies current pain. Asking to go home.  No recent fever/ +s1/s2  Lungs: CTAB, good respiratory effort  Abdomen: s/nt/nd  Ext: Moves all 4 extremities, no c/c/e, R shoulder hematoma  Neuro: CN Intact, no focal deficits      Discharge meds     Medication List      START taking these medications    aspirin 81 MG Tb medications from any pharmacy    Bring a paper prescription for each of these medications  · cyanocobalamin 500 MCG Tabs         Consults: IP CONSULT TO PHYSICAL THERAPY  IP CONSULT TO OCCUPATIONAL THERAPY  IP CONSULT TO CARDIOLOGY  IP CONSULT TO NEUROLOGY pronounced at L4-5 and L5-S1. Trace anterolisthesis of L4 on L5 by approximately 4 mm. Vertebral body heights are well-maintained. Degenerative disc space loss endplate spurring throughout the lumbar spine, most pronounced at L5-S1. Normal mineralization. along. Osteoarthritic changes throughout the right elbow. Normal mineralization. CONCLUSION:  Suboptimal exam due to difficulties with positioning. No obvious acute displaced fracture identified. Enthesopathy along the medial epicondyle along.  Maribel Marnio at the glenohumeral and acromioclavicular joints. No Definite acute fracture or dislocation seen. The acromioclavicular joint otherwise appears intact. No definite acute bony abnormality identified.  If there is continued clinical concern, MRI shoulder stud No sign of acute inflammation. SKULL:             No depressed calvarial fracture. CONCLUSION:  No acute intracranial hemorrhage or depressed calvarial fracture.   Stable meningiomas along the left anterior cranial fossa and left posterior fossa abutti unchanged. The mild to moderate left neural foraminal stenosis at C2-3. Stable moderate to severe bilateral neural foraminal stenosis at C3-4. Stable moderate right and moderate to severe left neural foraminal stenosis at C4-5.  Stable moderate bilateral ne one view this measures approximately 12.5 x 9.0 cm. Within the soft tissues immediately posterior to the scapular wing there is a fat containing lesion which most likely represents a lipoma. This measures 8 cm x 3.7 cm. EFFUSION:  As above.  OTHER:  Negativ 10/20/2017 at 21:40     Approved by: Jhon Pickering MD             Venous Doppler Leg Bilat - Vas Lab    Result Date: 10/2/2017   ---------------------------------------------------------------------------- Lower Extremity Venous Duplex Patient: CARLOS !       !          !spontaneous; normal          ! !                         !       !          !augmentation                 ! +-------------------------+-------+----------+-----------------------------+ ! Right superficial femoral!Patent ! None      ! Comp !Compressible                 ! +-------------------------+-------+----------+-----------------------------+ ! Right great saphenous    ! Patent ! None      ! Compressible                 !  +-------------------------+-------+----------+------------------------ ! +-------------------------+-------+----------+-----------------------------+ ! Left popliteal           !Patent ! None      ! Normal phasicity;            ! !                         !       !          !spontaneous; normal          ! ! performed with the patient in the supine position.  The right common femoral, right superficial femoral, right profunda femoral, right popliteal, right peroneal, right posterior tibial, right gastrocnemius, right soleal, left common femoral, left superficia

## 2017-10-24 NOTE — OCCUPATIONAL THERAPY NOTE
OCCUPATIONAL THERAPY TREATMENT NOTE - INPATIENT     Room Number: 806/139-R  Session: 2   Number of Visits to Meet Established Goals: 5    Presenting Problem: frequent falls and generalized weakness    History related to current admission: Pt is 80year old 10/20/17:  \"CONCLUSION:  No acute fracture of the right shoulder. There is chronic subluxation of the humeral head superiorly in relation to the glenoid suggesting a chronic rotator cuff tear.  There is extensive soft tissue swelling overlying the right sh superficial vein thrombosis. \"     Xr Chest 10/20/17: \"CONCLUSION:  Mild scarring/atelectasis in the lower lungs with possible small bilateral pleural effusions. No lobar pneumonia or overt congestive failure. 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   1970: SPECIAL SERVICE OR REPORT      Comment: E session/findings; All patient questions and concerns addressed    ASSESSMENT   Pt is progressing towards OT goals.  Pt presents with deficits in pain management, strength, balance, endurance, activity tolerance, functional reach, use of adaptive techniques i

## 2017-10-25 NOTE — CM/SW NOTE
Patient discharged on 10/24/2017 as previously planned.        10/25/17 0800   Discharge disposition   Discharged to: Skilled Nurs   Name of Facillity/Home Care/Hospice [facility]   Patient is Discharged to a [address] [place], [place] [address]st Arnold Yes   Discharge transp

## 2017-11-01 PROBLEM — I82.621 ACUTE DEEP VEIN THROMBOSIS (DVT) OF BRACHIAL VEIN OF RIGHT UPPER EXTREMITY (HCC): Status: ACTIVE | Noted: 2017-01-01

## 2017-11-01 NOTE — ED PROVIDER NOTES
Patient Seen in: [facility] Emergency Department    History   Patient presents with:  Deep Vein Thrombosis (cardiovascular)    Stated Complaint: DVT right arm    HPI    51-year-old male sent from the nursing home for DVT in the right upper extremity stapes bone replacements  07/12/2011: SKIN SURGERY      Comment: BCC nodular / L cheek / bx by Dr. Laura Moura                / Mohs surgery by Dr. 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 No abdominal masses. No peritoneal signs   Extremities: 3+ pitting edema to the right upper extremity from the elbow to the hands. Radial pulse 3+, equal by laterally.   Capillary refill less than 2 seconds in the extremiti (ljp=[id number])    Result Date: 11/1/2017  PROCEDURE:  ULTRASOUND VENOUS BLOOD FLOW OF THE RIGHT ARM  COMPARISON:  None. INDICATIONS:  Swollen arm on right.   TECHNIQUE:  Real time, grey scale, and duplex ultrasound was used to evaluate the upper extremity lennie brachial vein of right upper extremity (HCC)  (primary encounter diagnosis)    Disposition:  Admit    Follow-up:  No follow-up provider specified.     Medications Prescribed:  Current Discharge Medication List        Present on Admission  Date Reviewed: 9/2

## 2017-11-01 NOTE — ED INITIAL ASSESSMENT (HPI)
Pt has positive DVT in his right arm at the NH today. No c/o pain. States he stopped taking Xarelto a couple of days ago. Was admitted here last week for a fall at which time he injured his right shoulder.

## 2017-11-02 NOTE — PLAN OF CARE
Assumed care at 0730. Pt A&O x4. [place]. On room air. Atrial paced on tele. R upper extremity swelling, bruising, and limited ROM. Pt unable to elevate R arm. Hep gtt infusing at 13.5ml/hr; next PTT tomorrow Am.  Bilateral upper extremity skin tears and rednes

## 2017-11-02 NOTE — CONSULTS
[facility]  Report of Consultation    [address] Patient Status:  Inpatient    1931 MRN [id number]   [facility] 7NE-A Attending Paola Manzano, DO   Hosp Day # 1 PCP Emilie Veronica MD     REASON FOR CONSULTATION: Lita Ryan                / Mohs surgery by Dr. 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    Physical Exam:   Blood pressure 124/61, pulse 60, temperature 97.8 °F (36.6 °C), temperature source Oral, resp. rate 18, weight 80.7 kg (177 lb 14.6 oz), SpO2 96 %.     Performance Status: 3   General: Patient is alert and oriented x 3, not in acute 12.1 (L)     Component Hematocrit Platelet Count MCV   Latest Ref Rng & Units 37.0 - 53.0 % 150.0 - 450.0 10(3)uL 80.0 - 99.0 fL   11/1/2017 33.8 (L) 348.0 101.8 (H)   10/24/2017 31.8 (L) 257.0 102.6 (H)   10/21/2017 31.4 (L) 266.0 100.3 (H)   10/20/2017 3 unspecified     At risk for falling     Nondisplaced fracture of left radial styloid process, initial encounter for closed fracture     Frequent falls     Contusion of elbow, unspecified laterality, initial encounter     Skin tear of elbow without complica

## 2017-11-02 NOTE — PROGRESS NOTES
11/02/17 1534   Clinical Encounter Type   Visited With Health care provider;Patient not available   Continue Visiting Yes    attempted to visit the patient; however, the patient was engaged in his activities of daily living.  Respected his time/s

## 2017-11-02 NOTE — H&P
[facility] Hospitalist History and Physical      Patient presents with:  Deep Vein Thrombosis (cardiovascular)       PCP: Barby Pinon MD      History of Present Illness: Patient is a 80year old male with PMH sig for afib, gerd, gout, and HTN who presen left temple/ mohs by AB  2/21/17: SKIN SURGERY      Comment: BCC-superficial to right malar cheek/ MOHS by                AB  No date: SPECIAL SERVICE OR REPORT      Comment: JEFFERY Kiran.   1970: SPECIAL SERVICE OR REPORT      Comment: Ear Surgery - sta intact. Nose: Nares normal,  Mucosa normal    Throat: Lips, mucosa, and tongue normal   Neck: Supple, symmetrical, trachea midline   Lungs:   Clear to auscultation bilaterally.  Normal effort   Chest wall:  No tenderness or deformity   Heart:  Regular ra OTHER:  Negative. CONCLUSION:  1. 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 2. Large suprapatellar joint effusion. 3. No acute process is appreciated. Dictated by: Britton Hashimoto, DO on 10/23/2017 at 17:18     Approved by:  Britton Hashimoto, obvious acute displaced fracture identified. There are osteoarthritic changes in the left elbow along with mild enthesopathy along the lateral epicondyle.     Dictated by: Anthony Dykes MD on 10/20/2017 at 22:02     Approved by: Anthony Dykes MD mineralization. CONCLUSION:  No acute fracture of the right shoulder. There is chronic subluxation of the humeral head superiorly in relation to the glenoid suggesting a chronic rotator cuff tear.  There is extensive soft tissue swelling overlying the acute intracranial hemorrhage or depressed calvarial fracture. Stable meningiomas along the left anterior cranial fossa and left posterior fossa abutting the tentorium.   Review bone cerebellar atrophy with chronic microvascular ischemic changes of aging neural foraminal stenosis at C3-4. Stable moderate right and moderate to severe left neural foraminal stenosis at C4-5. Stable moderate bilateral neural foraminal stenosis at C5-6.  Stable moderate right and moderate to severe left neural foraminal stenosis scapular wing there is a fat containing lesion which most likely represents a lipoma. This measures 8 cm x 3.7 cm. EFFUSION:  As above. OTHER:  Negative. CONCLUSION:  There is a very large hemarthrosis present within the right shoulder joint.  Mild sub Approved by: Mustapha Madrigal MD            Us Venous Doppler Arm Right - Diag Im (cpt=93971)    Result Date: 10/20/2017  PROCEDURE:  ULTRASOUND VENOUS BLOOD FLOW OF THE RIGHT ARM  COMPARISON:  None.   INDICATIONS:  eval for DVT  TECHNIQUE:  Real time, grey Approved by: Arianne Andino MD               Assessment/Plan:     Patient is a 80year old male with PMH sig for afib, gerd, gout, and HTN who presents to Er from SNF with RUE swelling.     #RUE DVT  -started on heparin gtt, [facility]

## 2017-11-02 NOTE — PHYSICAL THERAPY NOTE
Pt started on Heparin drip at 0215 today 11/2/17. Pt not appropriate for mobility for at least 24 hours. Will see for PT as appropriate.

## 2017-11-02 NOTE — CONSULTS
[facility]  Report of Consultation    [address] Patient Status:  Inpatient    1931 MRN [id number]   [facility] 7NE-A Attending Angelia Park, DO   Hosp Day # 1 PCP Geno Dewey MD     Reason for Consultation:  R Dr. Nancy Heck                / Mohs surgery by Dr. 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 General: Alert, orientated x3. Affect is normal.  In hospital bed. No apparent distress. Patient's son is at bedside    Extremities:    Right shoulder has intact skin.   There is swelling right around th Meningioma (HCC)     SSS (sick sinus syndrome) ([facility] [address]     Cardiomyopathy [facility])     Pacemaker     Fall     Fall, initial encounter     Wrist fracture, left, closed, initial encounter     Urinary tract infection without hematuria, site unspecified     At risk

## 2017-11-02 NOTE — CM/SW NOTE
Pt seen for readmission for DVT in his right arm. Pt is an 80 y o male who came from [facility] where he went after his last admission for THAI. Pt was having frequent falls. He has been at [facility] for about one week.   Pt usually lives in independent living

## 2017-11-02 NOTE — PLAN OF CARE
NURSING ADMISSION NOTE    Pt admitted from ED, c/o right arm swelling. Dx: Dvt. Patient admitted via [address] to room. Safety precautions initiated. Bed in low position. Call light in reach. Admission navigators completed. Pt a/ox4.  Jatinder Camargo

## 2017-11-03 NOTE — PROGRESS NOTES
[facility]  Progress Note    [address] Patient Status:  Inpatient    1931 MRN [id number]   [facility] 7NE-A Attending Anthony Quispe, [address] Day # 2 PCP Mery Blakely MD     Subjective:    Feeling better  Right initial encounter     Wrist fracture, left, closed, initial encounter     Urinary tract infection without hematuria, site unspecified     At risk for falling     Nondisplaced fracture of left radial styloid process, initial encounter for closed fracture

## 2017-11-03 NOTE — DISCHARGE SUMMARY
General Medicine Discharge Summary     Patient ID:  Joshua Rock LENWBX  37 year old  1/22/1931    Admit date: 11/1/2017    Discharge date and time: 11/3/17     Attending Physician: DO DUSTIN Smith WORK    Operative Procedures:        Patient instructions:      Current Discharge Medication List    START taking these medications    apixaban 5 MG Oral Tab  Take 2 tabs (10mg) by mouth twice daily for 7 days, then take 1 tab (5mg) by mouth twice daily th coordinating care for discharge: Greater than 30 minutes    Luis Kwok DO  [facility] Hospitalist  [phone]

## 2017-11-03 NOTE — PHYSICAL THERAPY NOTE
PHYSICAL THERAPY EVALUATION - INPATIENT     Room Number: 7319/0119-Q  Evaluation Date: 11/3/2017  Type of Evaluation: Initial  Physician Order: PT Eval and Treat    Presenting Problem: ACute DVT of brachial vein of RUE  Reason for Therapy: Mobility Dys prostate without urinary obstruction and other lower urinary tract symptoms (LUTS)    • OTHER DISEASES 5/09    nl carotid dopplers   • Personal history of other diseases 2/2008    nl cardiolite   • SEASONAL ALLERGIES    • Unspecified essential hypertension PAIN ASSESSMENT  Ratin          COGNITION  · Memory:  decreased short term memory    RANGE OF MOTION AND STRENGTH ASSESSMENT  Upper extremity ROM and strength: See OT eval    Lower extremity ROM is within functional limits     Lower extrem chair in room and on grab bar in bathroom when standing from toilet. Pt ambulated 48' with CGA throughout hospital hallway and into bathroom with 2WW. Pt stated he needs to go to the bathroom, brief was soiled with bowel movement and urine already.  Pt did mobility; Energy conservation;Patient education; Family education;Gait training;Strengthening;Transfer training;Balance training  Rehab Potential : Good  Frequency (Obs): 5x/week  Number of Visits to Meet Established Goals: 3      CURRENT GOALS    Goal #1 Pa

## 2017-11-03 NOTE — PROGRESS NOTES
11/03/17 1328   Clinical Encounter Type   Visited With Patient and family together   Continue Visiting No  (pt being discharged)    updated demographic to Romario Corrigan.

## 2017-11-03 NOTE — PLAN OF CARE
Pt A/Ox4, [place], on RA, A-Paced per tele, denies any pain  Pt up and ambulating with 1 assist and walker  Heparin gtt d/c'd, Eliquis started  PLAN: Discharge to [address] [place] [address]  Will cont to monitor       Impaired Activities of Daily Living    • Achieve highest/saf

## 2017-11-03 NOTE — CM/SW NOTE
Pt is ready for d/c today. Pt will return to [facility]. Called Keri at [facility] to set up d/c time. Answered questions regarding d/c process. IM given. Family wants pt to go by [employer] cost was explained to family.   Arranged Edw medicar to bety

## 2017-11-03 NOTE — OCCUPATIONAL THERAPY NOTE
OCCUPATIONAL THERAPY EVALUATION - INPATIENT     Room Number: 7065/2108-V  Evaluation Date: 11/3/2017  Type of Evaluation: Initial  Presenting Problem: R UE brachail vein DVT    Physician Order: IP Consult to Occupational Therapy  Reason for Therapy: ADL/IA COLONOSCOPY,DIAGNOSTIC      Comment: nl colonoscopy, repeat in 10 years  1/8/2015: OTHER SURGICAL HISTORY      Comment: Chris Acevedo  09/03/15: OTHER SURGICAL HISTORY      Comment: Cystoscopy - Dr. Kandice Acevedo   No date: OTHER SURGICAL HISTORY      Comment: PAIN ASSESSMENT  Ratin          COGNITION  Overall Cognitive Status:  WFL - within functional limits    VISION  Current Vision: no visual deficits    PERCEPTION  Overall Perception Status:   WFL - within functional limits    SENSATION  Light ulises recommendation. She verbalized understanding. Patient End of Session: Up in chair;Needs met;Call light within reach;RN aware of session/findings; All patient questions and concerns addressed; Alarm set    ASSESSMENT     Patient is a 80year old male admit Recommendations: None    PLAN  OT Treatment Plan: Balance activities; Energy conservation/work simplification techniques;ADL training;Functional transfer training;UE strengthening/ROM; Patient/Family education;Patient/Family training;Equipment eval/education

## 2017-11-03 NOTE — PLAN OF CARE
Assumed care at [facility]. Pt A/O x4. RA. A Paced on tele. VSS. Heparin gtt infusing at 13.5ml/hr. Next PTT onesimo morning. Will continue to monitor.     Patient/Family Goals    • Patient/Family Long Term Goal Progressing    • Patient/Family Short Term Goal Progr

## 2017-11-05 NOTE — ED PROVIDER NOTES
Patient Seen in: [facility] Emergency Department    History   Patient presents with:  Cardiac Arrest (cardiovascular)    Stated Complaint: cardiac arrest    HPI    Patient is an 51-year-old male, who arrives via EMS in cardiopulmonary arrest.  Bertin AB  2/21/17: SKIN SURGERY      Comment: BCC-superficial to right malar cheek/ MOHS by                AB  No date: SPECIAL SERVICE OR REPORT      Comment: JEFFERY with Magno.   1970: SPECIAL SERVICE OR REPORT      Comment: Ear Surgery - stapendectomy bilaterall RAINBOW DRAW LAVENDER   RAINBOW DRAW LIGHT GREEN   RAINBOW DRAW GOLD       ============================================================  ED Course  ------------------------------------------------------------     MDM     Patient was transferred to the ED

## 2017-11-05 NOTE — PROGRESS NOTES
11/05/17 1054   Clinical Encounter Type   Visited With Patient; Family   Routine Visit Introduction   Continue Visiting No   Crisis Visit Death;ED   Patient Spiritual Encounters   Spiritual Interventions  responded to page from staff for family s

## 2017-11-05 NOTE — ED INITIAL ASSESSMENT (HPI)
Pt presents from nh found down after approx 10 min, pt intubated in field, compressions in place, cyanotic

## 2022-08-25 NOTE — LETTER
[address] [address]  Dept: [phone]  Dept Fax: [phone]     Occupational restrictions  For: [facility]     No duty using right arm until cleared by occupational health          D no

## (undated) NOTE — ED AVS SNAPSHOT
[facility] Emergency Department    [place]  [address] [address]    Phone:  [phone]    Fax:  [address]   MRN: [id number]    Department:  [facility] Emergency Department   Date of Visit:  6/3 have any questions regarding your home medications, including potential side effects. Medication List      Notice     You have not been prescribed any medications.               Discharge Instructions       Tylenol for pain  Rest  Apply cool co tell this physician (or your personal doctor if your instructions are to return to your personal doctor) about any new or lasting problems. The primary care or specialist physician will see patients referred from the [facility] Emergency Department.  Julianne Anderson - If you are a smoker or have smoked in the last 12 months, we encourage you to explore options for quitting.     - If you have concerns related to behavioral health issues or thoughts of harming yourself, contact [facility] a Visualized portions of the orbits are unremarkable. IMPRESSION:  Sequelae of chronic small vessel ischemic disease is noted. No evidence of intracranial hemorrhage or extra-axial fluid collection.       No significant changes since prior exam.      Dictate

## (undated) NOTE — IP AVS SNAPSHOT
Patient Demographics     Address  [place] [address] Phone  [phone] [facility])  [phone] (Mobile) *Preferred* E-mail Address  [email or web address]      Emergency Contact(s)     Name Relation Home Work [address] [facility] Son [phone] Take 1 tablet (25 mg total) by mouth 2x Daily(Beta Blocker). Harlan Cerda MD         MULTIVITAMIN OR      Take 1 tablet by mouth daily.           tamsulosin HCl 0.4 MG Caps  Commonly known as:  FLOMAX      Take 1 capsule (0.4 mg total) by mouth da Temp  98.4 °F (36.9 °C) Filed at 10/24/2017 1156   SpO2  96 % Filed at 10/24/2017 1156      Patient's Most Recent Weight    Flowsheet Row Most Recent Value   Patient Weight  80.7 kg (178 lb)         Lab Results Last 24 Hours      BASIC METABOLIC PANEL (8) Type Source Collected On   Blood — 10/24/17 0712          Components    Component Value Reference Range Flag Lab   WBC 12.1 4.0 - 13.0 x10(3) uL — [facility]   RBC 3.10 3.80 - 5.80 x10(6)uL [facility]   HGB 10.5 13.0 - 17.0 g/dL [facility]   HCT 31.8 3 made the swelling worse. Pt denies current pain. Asking to go home. No recent fever/chills. [LD.2]     PMH  Past Medical History:   Diagnosis Date   • Allergic rhinitis, cause unspecified    • Arrhythmia    • Atrial fibrillation ([address]     resolved   • [facility] Prescriptions Marked as Taking for the 10/20/17 encounter [facility] Encounter):  allopurinol 100 MG Oral Tab TAKE 2 & 1/2 TABLETS BY MOUTH ONCE DAILY Disp: 270 tablet Rfl: 0   FINASTERIDE 5 MG Oral Tab TAKE 1 TABLET (5 MG TOTAL) BY MOUTH DAILY. Comprehensive ROS reviewed and negative except for what's stated above.        OBJECTIVE:  /71 (BP Location: Left arm)   Pulse 60   Temp 98.3 °F (36.8 °C) (Oral)   Resp 16   Wt 179 lb 7.3 oz (81.4 kg)   SpO2 96%   BMI 26.50 kg/m²   PE:  Gen: alert and no obvious acute displaced fracture identified. There are osteoarthritic changes in the left elbow along with mild enthesopathy along the lateral epicondyle.     Dictated by: Jhon Pickering MD on 10/20/2017 at 22:02     Approved by: Jhon Pickering MD acromioclavicular joint right glenohumeral joint. Normal mineralization. CONCLUSION:  No acute fracture of the right shoulder.  There is chronic subluxation of the humeral head superiorly in relation to the glenoid suggesting a chronic rotator cuff [employer].   PATIENT STATED HISTORY: (As transcribed by Technologist)  Patient with generalized weakness and fequent falls today, unknown head or neck injury    FINDINGS:  VENTRICLES/SULCI:  Diffuse cerebral and cerebellar atrophy INTRACRANIAL:  No acu STATED HISTORY: (As transcribed by Technologist)  Patient with generalized weakness and frequent falls today, unknown head or neck injury. FINDINGS:  There is incomplete fusion of the posterior arch of C1 which is considered a normal variant.  There is no None.  INDICATIONS:  eval for DVT  TECHNIQUE:  Real time, grey scale, and duplex ultrasound was used to evaluate the upper extremity venous system. B-mode two-dimensional images of the vascular structures, Doppler spectral analysis, and color flow.   Dopple ---------------------------------------------------------------------------- Tables: Venous flow and imaging: +-------------------------+-------+----------+-----------------------------+ ! Location                 ! Overall! Thrombosis! Flow properties +-------------------------+-------+----------+-----------------------------+ ! Right popliteal          !Patent ! None      ! Normal phasicity;            ! !                         !       !          !spontaneous; normal          ! ! +-------------------------+-------+----------+-----------------------------+ ! Left profunda femoral    !Patent ! None      ! Normal phasicity;            ! !                         !       !          !spontaneous; normal          ! ! ! !                         !       !          !compressible                 ! +-------------------------+-------+----------+-----------------------------+ ! Left soleal              !Patent ! None      ! Compressible                 !  +-------------------- Tortuous thoracic aorta. Cardiomegaly with normal pulmonary vascularity. Possible small bilateral pleural effusions. No pneumothorax. Mild scarring/atelectasis in the lower lungs.       CONCLUSION:  Mild scarring/atelectasis in the lower lungs with possible Electronically signed by Leonides Napier MD on 10/21/2017  1:06 PM   Attribution Key    LD. 1 - Leonides Napier MD on 10/21/2017  7:31 AM  LD. 2 - Leonides Napier MD on 10/21/2017 12:53 PM                        Consults - MD Consult Notes      Consults signed Leukocytosis, unspecified type    Cardiomyopathy, unspecified type ([facility] [address]    Traumatic hematoma of left shoulder, initial encounter      Past Medical History  Past Medical History:   Diagnosis Date   • Allergic rhinitis, cause unspecified    • Arrhythmia Patient’s self-stated goal is to go to [facility]    OBJECTIVE  Precautions: Hard of hearing    WEIGHT BEARING RESTRICTION  Weight Bearing Restriction: None                PAIN ASSESSMENT   Rating: Unable to rate  Location: R knee with flexion  Management differences between THAI and AR. Pt and son verbalize understanding.      THERAPEUTIC EXERCISES  Lower Extremity Ankle pumps  Glut sets  Hip AB/AD  Heel slides  Quad sets  SLR     Upper Extremity      Position Supine     Repetitions   10-15   Sets   1     Pa Author:  Michelle Mead PT Service:  (none) Author Type:  Physical Therapist    Filed:  10/22/2017  4:18 PM Date of Service:  10/22/2017  4:09 PM Status:  Signed    :  Michelle Mead PT (Physical Therapist)           PHYSICAL THERAPY EVALUATION 2005: COLONOSCOPY      Comment: NL  9/2005: COLONOSCOPY,DIAGNOSTIC      Comment: nl colonoscopy, repeat in 10 years  1/8/2015: OTHER SURGICAL HISTORY      Comment: Ayesha Anderson  09/03/15: OTHER SURGICAL HISTORY      Comment: Cystoscopy - Dr. Marsha Gusman Lower extremity strength is within functional limits     BALANCE  Static Sitting: Good  Dynamic Sitting: Fair +  Static Standing: Fair -  Dynamic Standing: Poor +    ADDITIONAL TESTS  Additional Tests: Elderly Mobility Scale     Elderly Mobility Scale: 8/2 when left room. Exercise/Education Provided:  Bed mobility  Functional activity tolerated  Gait training    Patient End of Session: Up in chair;Needs met;Call light within reach;RN aware of session/findings; All patient questions and concerns addressed;F Goal #2 Patient is able to demonstrate transfers Sit to/from Stand at assistance level: supervision     Goal #3 Patient is able to ambulate 50 feet with assist device: walker - rolling at assistance level: supervision     Goal #4    Goal #5    Goal #6    G undersurface of the acromion. No evidence of fracture however. There   are at least moderate osteoarthritic changes present.  Given the degree of hemorrhage, an underlying mass is difficult to exclude on this exam. This could be assessed with postcontrast M shoulder may be indicative of rotator cuff tear. Underlying osteoarthritis of the acromioclavicular and glenohumeral joints. No definite acute fracture or dislocation of the right shoulder. \"     CT Brain Or Head 10/20/17: \"CONCLUSION:  No acute intracran • Hx of diseases NEC     hydrocele   • Hypertrophy of prostate without urinary obstruction and other lower urinary tract symptoms (LUTS)    • OTHER DISEASES 5/09    nl carotid dopplers   • Personal history of other diseases 2/2008    nl cardiolite   • SEAS How much help from another person does the patient currently need…  -   Putting on and taking off regular lower body clothing?: A Lot  -   Bathing (including washing, rinsing, drying)?: A Lot  -   Toileting, which includes using toilet, bedpan or urinal? : rehabilitation patient should achieve mod ind level in basic ADLs.[SH.2]    OT Discharge Recommendations: Sub-acute rehabilitation (ELOS: 14-16 days)  OT Device Recommendations: TBD    PLAN  OT Treatment Plan: Balance activities; ADL training; Endurance Elvia Flynn Per note from Dr. Manolo Montenegro 10/22: \"Patient has a multifactorial gait disorder with falling, including posterior fossa meningioma, which is stable, cervical and lumbar degenerative joint disease, which are likely getting worse over time, and a history of p Xr R Shoulder 9/30/17:  \"RIGHT SHOULDER ROUTINE internal rotation, external rotation, and axillary views HISTORY: Adhesive capsulitis of right shoulder FINDINGS: High riding right shoulder may be indicative of rotator cuff tear.  Significant osteoarthritic Contusion of elbow, unspecified laterality, initial encounter    Skin tear of elbow without complication, unspecified laterality, initial encounter    Renal insufficiency    Anemia, unspecified type    Leukocytosis, unspecified type    Cardiomyopathy, un No date: SPECIAL SERVICE OR REPORT      Comment: rectal fistula repaired x 3.   No date: SPECIAL SERVICE OR REPORT      Comment: L elbow surgyer x 2    OCCUPATIONAL PROFILE    HOME SITUATION  Type of Home: Independent living facility ([address] [place] [address])  H Upper extremity ROM is within functional limits for ANTONIETA MANN hand, wrist and elbow WFL, shoulder flexion 0-30    Upper extremity strength is within functional limits for ANTONIETA MANN not tested for shoulder, elbow, wrist and hand WFL    COORDINATION  Gross Ha[employer] with cueing for hand placement), functional mobility (10ft, rw, min assist), toileting (pt able to use hand held urinal in bed w/ s/u), LB Dress (pt doff socks at EOB w/ sup using foot on knee method, pt requiring max assist to don socks at EOB w/o AE).   Che Ramirez maximizing patient’s ability to return safely to his prior level of function.     Patient Complexity  Occupational Profile/Medical History HIGH - Extensive review of history including review of medical or therapy records    Specific performance deficits imp 11/9/2017 9:20 AM Joselin Alcantar MD [address]    11/13/2017 9:20 AM Delphine Smith MD  CARDIOLOGY [facility]    11/16/2017 4:00 PM Kota Ruiz MD [address] [place] [address]    12/20

## (undated) NOTE — IP AVS SNAPSHOT
[address]            (For Outpatient Use Only) Initial Admit Date: 10/20/2017   Inpt/Obs Admit Date: Inpt: 10/21/17 / Obs: 10/22/17   Discharge Date:    Jared Captain:  [de-identified]   MRN: [de-identified]   CSN: [id number]        ENCOUNTER Subscriber ID:  Pt Rel to Subscriber:    Hospital Account Financial Class: Medicare    October 24, 2017

## (undated) NOTE — ED AVS SNAPSHOT
[facility] Emergency Department    [place]  [address] [address]    Phone:  [phone]    Fax:  [address]   MRN: [id number]    Department:  [facility] Emergency Department   Date of Visit:  6/3 IF THERE IS ANY CHANGE OR WORSENING OF YOUR CONDITION, CALL YOUR PRIMARY CARE PHYSICIAN AT ONCE OR RETURN IMMEDIATELY TO THE EMERGENCY DEPARTMENT.     If you have been prescribed any medication(s), please fill your prescription right away and begin taking t

## (undated) NOTE — IP AVS SNAPSHOT
[address]            (For Outpatient Use Only) Initial Admit Date: 11/1/2017   Inpt/Obs Admit Date: Inpt: 11/1/17 / Obs: N/A   Discharge Date:    Link Pattee:  [de-identified]   MRN: [de-identified]   CSN: [id number]        ENCOUNTER  Patient Group Number:  Insurance Type:    Subscriber Name:  Subscriber :    Subscriber ID:  Pt Rel to Subscriber:    Hospital Account Financial Class: Medicare    November 3, 2017

## (undated) NOTE — IP AVS SNAPSHOT
Patient Demographics     Address  [place] [address] Phone  [phone] [facility])  [phone] (Mobile) *Preferred* E-mail Address  [email or web address]      Emergency Contact(s)     Name Relation Home Work [address] [facility] Son [phone] Next dose due:  Monday morning 11/6      Take 50,000 Units by mouth once a week. Every Monday  Stop taking on:  12/18/2017          cyanocobalamin 500 MCG Tabs  Next dose due: Tomorrow morning 11/4      Take 1 tablet (500 mcg total) by mouth daily.    Emily Zapata [id number] Alfuzosin HCl ER (UROXATRAL) 24 hr tab 10 mg 11/03/17 0952 Given      [id number] allopurinol (ZYLOPRIM) tab 250 mg 11/03/17 0952 Given      [id number] amiodarone HCl (PACERONE) tab 200 mg 11/03/17 0952 Given      [id number] apixaban (ELIQUIS) tab 1 Sodium 142 136 - 144 mmol/L — [facility]   Potassium 4.2 3.6 - 5.1 mmol/L [facility]   Chloride 111 101 - 111 mmol/L — [facility]   CO2 25.0 22.0 - 32.0 mmol/L [facility]            PTT, ACTIVATED [[id number]] (Abnormal)  Resulted: 11/03/17 0630, Result afib, gerd, gout, and HTN who presents to Er from SNF with RUE swelling. Pt was just discharged last week when he was seen for recurrent falls, his xarelto was discontinued at that time.  He has chronic issues with pain and ROM of RUE due to ?rotator cuff i 1970: SPECIAL SERVICE OR REPORT      Comment: Ear Surgery - stapendectomy bilaterally  No date: SPECIAL SERVICE OR REPORT      Comment: rectal fistula repaired x 3.   No date: SPECIAL SERVICE OR REPORT      Comment: L elbow surgyer x 2     ALL:    Sulfa Ant Chest wall:  No tenderness or deformity   Heart:  Regular rate and rhythm, S1, S2 normal, no murmur, rub or gallop appreciated   Abdomen:   Soft, non-tender. Bowel sounds normal. No masses,  No organomegaly.  Non distended   Extremities: Extremities normal, Dictated by: Nate Bellamy DO on 10/23/2017 at 17:18     Approved by:  Nate Bellamy DO            Xr Lumbar Spine (min 4 Views) (cpt=72110)    Result Date: 10/21/2017  PROCEDURE:  XR LUMBAR SPINE (MIN 4 VIEWS) (CPT=72110)  TECHNIQUE:  AP, lateral, oblique, epicondyle.     Dictated by: Nando Montoya MD on 10/20/2017 at 22:02     Approved by: Nando Montoya MD            Xr Elbow, Complete (min 3 Views), Right (cpt=73080)    Result Date: 10/20/2017  PROCEDURE:  XR ELBOW, COMPLETE (MIN 3 VIEWS), RIGHT (CPT=73 subluxation of the humeral head superiorly in relation to the glenoid suggesting a chronic rotator cuff tear. There is extensive soft tissue swelling overlying the right shoulder.  There are mild osteoarthritic changes the right acromioclavicular joint righ fracture. Stable meningiomas along the left anterior cranial fossa and left posterior fossa abutting the tentorium.   Review bone cerebellar atrophy with chronic microvascular ischemic changes of aging    Dictated by: Sarah Sampson MD on 10/20/2017 at 20: Stable moderate right and moderate to severe left neural foraminal stenosis at C4-5. Stable moderate bilateral neural foraminal stenosis at C5-6. Stable moderate right and moderate to severe left neural foraminal stenosis at C6-7.   Perispinal soft tissues there is a fat containing lesion which most likely represents a lipoma. This measures 8 cm x 3.7 cm. EFFUSION:  As above. OTHER:  Negative. CONCLUSION:  There is a very large hemarthrosis present within the right shoulder joint.  Mild subluxation of th Approved by: Christina Santillan MD            Us Venous Doppler Arm Right - Diag [facility] (cpt=93971)    Result Date: 10/20/2017  PROCEDURE:  ULTRASOUND VENOUS BLOOD FLOW OF THE RIGHT ARM  COMPARISON:  None.   INDICATIONS:  eval for DVT  TECHNIQUE:  Real time, grey s Approved by: Oniel Landry MD               Assessment/Plan:     Patient is a 80year old male with PMH sig for afib, gerd, gout, and HTN who presents to Er from SNF with RUE swelling.     #RUE DVT  -started on heparin gtt, will need to determine [facility] plan 1. IP consult to Orthopedic Surgery Once [[id number]] ordered by Brooklyn Malagon DO at [address] [address]  Report of Consultation    Parish Nino Patient Status:  Inpatient    1931 MRN [id number]   [facility] No date: OTHER SURGICAL HISTORY      Comment: stapes bone replacements  07/12/2011: SKIN SURGERY      Comment: BCC nodular / L cheek / bx by Dr. Nataly Key                / Mohs surgery by Dr. Blaine Zepeda  2/21/17: SKIN SURGERY      Comment: SCC to left •  acetaminophen (TYLENOL) tab 650 mg, 650 mg, Oral, Q6H PRN    Physical Exam:  Blood pressure 124/61, pulse 60, temperature 97.8 °F (36.6 °C), temperature source Oral, resp. rate 18, weight 177 lb 14.6 oz (80.7 kg), SpO2 96 %.     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 They are not looking for any major surgery. Patient may use right arm as tolerated. Follow-up with orthopedics as needed.       Lord Ian MD  [facility]  11/2/2017  12:24 PM[AK.1]    Electronically signed by Patient is a 80year old male with PMH sig for afib, gerd, gout, and HTN who presents to Er from SNF with RUE swelling.     #RUE DVT  -started on heparin gtt, switch to eliquis with plan for 6 weeks AC  -US with brachial vein dvt  -heme on consult     #RUE tamsulosin HCl (FLOMAX) 0.4 MG Oral Cap  Take 1 capsule (0.4 mg total) by mouth daily. Fexofenadine HCl (ALLEGRA) 180 MG Oral Tab  Take 1 tablet by mouth daily. Multiple Vitamins-Minerals (MULTIVITAMIN OR)  Take 1 tablet by mouth daily.         STOP t admitted on 11/1/2017 from [facility] with c/o increased swelling to R Ue, ultrasound performed and showed a clot. Pt diagnosed with Acute DVT of brachial V of RUE.         Therapy significant imaging (date, test, result):  Ultrasound Venous Blood Flow of R Past Surgical History:  No date: CARDIAC PACEMAKER PLACEMENT      Comment: Medtronic pacemaker  2005: COLONOSCOPY      Comment: NL  9/2005: COLONOSCOPY,DIAGNOSTIC      Comment: nl colonoscopy, repeat in 10 years  1/8/2015: [facility] Upper extremity ROM and strength[AT.1]: See OT eval[AT. 4]    Lower extremity ROM is within functional limits     Lower extremity strength is within functional limits[AT.1]: grossly globally 4/5.  Symmetrically B.[AT.4]     BALANCE[AT.1]  Static Sitting: [employer] bathroom when standing from toilet. Pt ambulated 48' with CGA throughout hospital hallway and into bathroom with 2WW. Pt stated he needs to go to the bathroom, brief was soiled with bowel movement and urine already.  Pt did not realize he was currently urin should achieve supervision level with transfers and ambulation to decrease the burden of care and increase independence. [AT.4]     PLAN[AT.1]  PT Treatment Plan: Bed mobility; Energy conservation;Patient education; Family education;Gait training;Strengthenin Author:  Aristides Dumont OT Service:  (none) Author Type:  Occupational Therapist    Filed:  11/3/2017 12:15 PM Date of Service:  11/3/2017 12:04 PM Status:  Signed    :  Aristides Dumont OT (Occupational Therapist)       OCCUPATIONAL THERAPY • SEASONAL ALLERGIES    • Unspecified essential hypertension        Past Surgical History  Past Surgical History:  No date: CARDIAC PACEMAKER PLACEMENT      Comment: Medtronic pacemaker  2005: COLONOSCOPY      Comment: NL  9/2005: Nicole Sensor them. \" ** Pt has been at [facility] for about a week now. Assistance with ADL, ambulated with PT and RW about 50 ft, per daughter.        OBJECTIVE  Precautions: Hard of hearing  Fall Risk: High fall risk    WEIGHT BEARING RESTRICTION  Weight Beari bathroom. Min A and min cueing to use grab bar during toilet transfer. CGA to m[MS. 1]aintain dynamic standing balance during toilet hygiene and clothing management (both total A). 98% room air. Fatigues easily.   Educated the pt about safe transfer seque MODERATE  3 - 5 performance deficits[MS. 2]   Client Assessment/Performance Deficits[MS. 1] MODERATE - Comorbidities and min to mod modifications of tasks[MS. 2]    Clinical Decision Making[MS. 1] MODERATE - Analysis of occupational profile, detailed assessmen 11/13/2017 9:20 AM Michelle Jim MD SP CARDIOLOGY [facility]    11/16/2017 4:00 PM Brady Wagner MD [address] [place] [address]    12/20/2017 11:15 AM [employer] [place] [facility] CARDIOLOGY [place]    3/22/2018 10:50 AM Presley Ivan